# Patient Record
Sex: FEMALE | Race: WHITE | NOT HISPANIC OR LATINO | Employment: OTHER | URBAN - METROPOLITAN AREA
[De-identification: names, ages, dates, MRNs, and addresses within clinical notes are randomized per-mention and may not be internally consistent; named-entity substitution may affect disease eponyms.]

---

## 2017-08-10 ENCOUNTER — APPOINTMENT (OUTPATIENT)
Dept: WOMENS IMAGING | Facility: HOSPITAL | Age: 67
End: 2017-08-10

## 2017-08-10 PROCEDURE — G0202 SCR MAMMO BI INCL CAD: HCPCS | Performed by: RADIOLOGY

## 2018-10-16 ENCOUNTER — APPOINTMENT (OUTPATIENT)
Dept: WOMENS IMAGING | Facility: HOSPITAL | Age: 68
End: 2018-10-16

## 2018-10-16 PROCEDURE — 77067 SCR MAMMO BI INCL CAD: CPT | Performed by: RADIOLOGY

## 2019-11-19 ENCOUNTER — APPOINTMENT (OUTPATIENT)
Dept: WOMENS IMAGING | Facility: HOSPITAL | Age: 69
End: 2019-11-19

## 2019-11-19 PROCEDURE — 77067 SCR MAMMO BI INCL CAD: CPT | Performed by: RADIOLOGY

## 2021-01-29 ENCOUNTER — APPOINTMENT (OUTPATIENT)
Dept: WOMENS IMAGING | Facility: HOSPITAL | Age: 71
End: 2021-01-29

## 2021-01-29 PROCEDURE — 77067 SCR MAMMO BI INCL CAD: CPT | Performed by: RADIOLOGY

## 2022-02-17 ENCOUNTER — APPOINTMENT (OUTPATIENT)
Dept: WOMENS IMAGING | Facility: HOSPITAL | Age: 72
End: 2022-02-17

## 2022-02-17 PROCEDURE — 77067 SCR MAMMO BI INCL CAD: CPT | Performed by: RADIOLOGY

## 2023-01-30 ENCOUNTER — TRANSCRIBE ORDERS (OUTPATIENT)
Dept: CT IMAGING | Facility: CLINIC | Age: 73
End: 2023-01-30
Payer: MEDICARE

## 2023-01-30 DIAGNOSIS — Z12.31 ENCOUNTER FOR SCREENING MAMMOGRAM FOR MALIGNANT NEOPLASM OF BREAST: Primary | ICD-10-CM

## 2024-02-09 ENCOUNTER — TRANSCRIBE ORDERS (OUTPATIENT)
Dept: CT IMAGING | Facility: CLINIC | Age: 74
End: 2024-02-09
Payer: MEDICARE

## 2024-02-09 DIAGNOSIS — Z12.31 ENCOUNTER FOR SCREENING MAMMOGRAM FOR MALIGNANT NEOPLASM OF BREAST: Primary | ICD-10-CM

## 2024-06-25 ENCOUNTER — CONSULT (OUTPATIENT)
Dept: ONCOLOGY | Facility: CLINIC | Age: 74
End: 2024-06-25
Payer: MEDICARE

## 2024-06-25 VITALS
WEIGHT: 216 LBS | SYSTOLIC BLOOD PRESSURE: 123 MMHG | OXYGEN SATURATION: 97 % | HEIGHT: 61 IN | RESPIRATION RATE: 18 BRPM | TEMPERATURE: 97.8 F | BODY MASS INDEX: 40.78 KG/M2 | HEART RATE: 79 BPM | DIASTOLIC BLOOD PRESSURE: 74 MMHG

## 2024-06-25 DIAGNOSIS — I82.811 SUPERFICIAL THROMBOSIS OF RIGHT LOWER EXTREMITY: Primary | ICD-10-CM

## 2024-06-25 PROCEDURE — 1160F RVW MEDS BY RX/DR IN RCRD: CPT | Performed by: INTERNAL MEDICINE

## 2024-06-25 PROCEDURE — 1159F MED LIST DOCD IN RCRD: CPT | Performed by: INTERNAL MEDICINE

## 2024-06-25 PROCEDURE — 1126F AMNT PAIN NOTED NONE PRSNT: CPT | Performed by: INTERNAL MEDICINE

## 2024-06-25 PROCEDURE — 99205 OFFICE O/P NEW HI 60 MIN: CPT | Performed by: INTERNAL MEDICINE

## 2024-06-25 RX ORDER — ASPIRIN 81 MG/1
1 TABLET, CHEWABLE ORAL DAILY
COMMUNITY

## 2024-06-25 RX ORDER — ATORVASTATIN CALCIUM 80 MG/1
1 TABLET, FILM COATED ORAL DAILY
COMMUNITY
Start: 2024-04-10

## 2024-06-25 RX ORDER — LISINOPRIL 10 MG/1
1 TABLET ORAL DAILY
COMMUNITY
Start: 1998-04-01

## 2024-06-25 NOTE — PROGRESS NOTES
CHIEF COMPLAINT: Chronic right calf thrombophlebitis status post surgery doing well    REASON FOR REFERRAL: Questionable hypercoagulable disorder      RECORDS OBTAINED  Records of the patients history including those obtained from patient and primary care and vascular surgeon were reviewed and summarized in detail.    Oncology/Hematology History Overview Note   1.  Bilateral varicose veins with thrombophlebitis and superficial vein thrombosis  2.  Vertebral artery stenosis  3.  Basal cell carcinoma  4.  Reflux  5.  Hypertension    Hematology history timeline:  -1/11/2024 primary care note indicates possible blood clot noticed yesterday with 3 palpable area is right medial leg firm and tender to the touch.  Pain with walking.  Feels like a deep bruise.  Takes daily baby aspirin.  No provocation.  COVID booster November 2023.  -4/26/2024 note from Dr. Filemon Sheppard vascular surgeon saw patient for follow-up with venous reflux imaging.  Last seen 3 months prior.  Compliant with compression elevation and exercise efforts daily with minimal relief of her symptoms.  Continues to have lower extremity edema with bulging varicosities that are tender and painful.  Has used heat for thrombophlebitis but no improvement.  Patient reports initially having right lower extremity clots 20 years ago where 2 or more varicosities thrombosed spontaneously.  Denies any deep vein thrombosis.  She said this has happened 2 more times in the past 20 years and always appears to be in the superficial system.  She has a known family history of what she thinks is factor V Leiden mutation.  Her son had a PE and heart attack at age 46 who was not worked up for this disorder.  He referred to hematology for opinion on whether blood thinners are needed or not.  On exam this day she had large bulging varicosities right medial thigh and calf progressively worsened because of pain and tenderness to the area.  Additionally has lower extremity edema.   Compliant with 30 mmHg compression, elevation and exercise over the prior 3 months with only minor improvement.  Venous reflux studies in his office this day showed grade 4 reflux of the right common femoral vein, saphenofemoral junction, greater saphenous vein with grade 2 reflux of the common femoral vein and SVT is noted in the proximal calf varicosities.  On the left she has has grade 2 reflux of the saphenofemoral junction.  He believes the patient would benefit from endovascular laser ablation of the right greater saphenous vein with microphlebectomy of varicosities.  Risks and alternatives explained.    - 6/25/2024 [facility] consult: I reviewed the patient's history and confirm the accuracy as above.  1/11/2024 Doppler venous imaging right lower extremity [facility] showed partially thrombosed varicose vein right calf with no DVT.  Specific anatomy not mentioned on report.  Repeat ultrasonography 4/26/2024 at ' office reported superficial venous thrombus in the proximal calf varicosities.  He plans endovascular laser ablation right greater saphenous vein with microphlebectomy of varicosities.  CBC unremarkable in January with normal baseline PT and PTT and CMP.  Bilateral screening mammogram 3/4/2024 BI-RADS 1.  Had benign colonoscopy within the last 3 years.  Thinks there may be a family history in second-degree relative with some hypercoagulable condition but they are not sure exactly which and had a son who had a PE with an MI in his 40s.  She had endovascular laser ablation with right greater saphenous vein microphlebectomy of the varicosities about 3 weeks ago and feels great and had no complications thereafter.  States that about a week out they repeated her ultrasound and showed no new or propagating clot postop on baby aspirin which she was on throughout this whole process.  The process itself started 20 years ago with large varicose veins and over time the right calf veins  have become tender and inflamed and ultimately thrombosed and I think that it is the order of events and not reverse order which is important as I do not think clot is the nidus of the problem but the result of the problem.  Vessel inflammation and stagnant see of flow together conspire to a local environment prone towards clotting and it would be highly unusual for a hypercoagulable disorder be that from inherited or acquired hypercoagulability or from malignancy but lurking without significant deep vein thrombosis or proximal progression over these last 20 years is exceedingly unlikely and now that she is doing well postop 3 weeks out from the above surgeries I would not likely start her on an anticoagulant at therapeutic or prophylactic low doses in the absence of a deep vein thrombotic progression or complications.  She has not had Trousseau's physiology.  All of the clot and all of the tenderness was in the chain of her right calf varix.  For completeness sake I will check these hypercoagulable labs for her informational purposes but I am unlikely to alter her regimen from aspirin.  For her situation, aspirin may actually have some advantage due to its anti-inflammatory component and the vessel wall inflammation that occurs with varices has is much to do with the procoagulant environment as does the stagnant flow from venous valvular reflux.     Superficial thrombosis of right lower extremity       HISTORY OF PRESENT ILLNESS:  The patient is a 73 y.o.  female, referred for question of hypercoagulable disorder as outlined in my notes above and below.  Doing well post vascular surgery 3 weeks ago    REVIEW OF SYSTEMS:  No complaints today    History reviewed. No pertinent past medical history.  Past Surgical History:   Procedure Laterality Date    VARICOSE VEIN SURGERY         Current Outpatient Medications on File Prior to Visit   Medication Sig Dispense Refill    atorvastatin (LIPITOR) 80 MG tablet Take 1  "tablet by mouth Daily.      lisinopril (PRINIVIL,ZESTRIL) 10 MG tablet Take 1 tablet by mouth Daily.      metFORMIN (GLUCOPHAGE) 500 MG tablet Every 12 (Twelve) Hours.      aspirin 81 MG chewable tablet Chew 1 tablet Daily.      Cholecalciferol 50 MCG (2000 UT) tablet Take 1 tablet by mouth Daily.      metoprolol tartrate (LOPRESSOR) 25 MG tablet Every 12 (Twelve) Hours.       No current facility-administered medications on file prior to visit.       No Known Allergies    Social History     Socioeconomic History    Marital status:    Tobacco Use    Smoking status: Never    Smokeless tobacco: Never   Vaping Use    Vaping status: Never Used   Substance and Sexual Activity    Alcohol use: Never    Drug use: Never       Family History   Problem Relation Age of Onset    Cancer Father     Diabetes Brother        PHYSICAL EXAM:  Right calf varicosities not prominent postoperatively and good healing of that environment with good arterial flow and venous egress.  No other cords.  Some varicosities in both legs minor at this point and nontender and noninflamed.  No venous stasis insufficiency changes.    /74   Pulse 79   Temp 97.8 °F (36.6 °C)   Resp 18   Ht 154.9 cm (61\")   Wt 98 kg (216 lb)   SpO2 97%   BMI 40.81 kg/m²     ECOG score: 1     No results found for: \"HGB\", \"HCT\", \"MCV\", \"PLT\", \"WBC\", \"NEUTROABS\", \"LYMPHSABS\", \"MONOSABS\", \"EOSABS\", \"BASOSABS\"  No results found for: \"GLUCOSE\", \"BUN\", \"CREATININE\", \"NA\", \"K\", \"CL\", \"CO2\", \"CALCIUM\", \"PROTEINTOT\", \"ALBUMIN\", \"BILITOT\", \"ALKPHOS\", \"AST\", \"ALT\"      Assessment & Plan   1.  Bilateral varicose veins with thrombophlebitis and superficial vein thrombosis of proximal right calf varicosity.  2.  Vertebral artery stenosis  3.  Basal cell carcinoma  4.  Reflux  5.  Hypertension    Hematology history timeline:  -1/11/2024 primary care note indicates possible blood clot noticed yesterday with 3 palpable area is right medial leg firm and tender to the touch. "  Pain with walking.  Feels like a deep bruise.  Takes daily baby aspirin.  No provocation.  COVID booster November 2023.  -4/26/2024 note from Dr. Filemon Sheppard vascular surgeon saw patient for follow-up with venous reflux imaging.  Last seen 3 months prior.  Compliant with compression elevation and exercise efforts daily with minimal relief of her symptoms.  Continues to have lower extremity edema with bulging varicosities that are tender and painful.  Has used heat for thrombophlebitis but no improvement.  Patient reports initially having right lower extremity clots 20 years ago where 2 or more varicosities thrombosed spontaneously.  Denies any deep vein thrombosis.  She said this has happened 2 more times in the past 20 years and always appears to be in the superficial system.  She has a known family history of what she thinks is factor V Leiden mutation.  Her son had a PE and heart attack at age 46 who was not worked up for this disorder.  He referred to hematology for opinion on whether blood thinners are needed or not.  On exam this day she had large bulging varicosities right medial thigh and calf progressively worsened because of pain and tenderness to the area.  Additionally has lower extremity edema.  Compliant with 30 mmHg compression, elevation and exercise over the prior 3 months with only minor improvement.  Venous reflux studies in his office this day showed grade 4 reflux of the right common femoral vein, saphenofemoral junction, greater saphenous vein with grade 2 reflux of the common femoral vein and SVT is noted in the proximal calf varicosities.  On the left she has has grade 2 reflux of the saphenofemoral junction.  He believes the patient would benefit from endovascular laser ablation of the right greater saphenous vein with microphlebectomy of varicosities.  Risks and alternatives explained.    - 6/25/2024 [facility] hematology consult: I reviewed the patient's history and confirm the accuracy as  above.  1/11/2024 Doppler venous imaging right lower extremity [facility] showed partially thrombosed varicose vein right calf with no DVT.  Specific anatomy not mentioned on report.  Repeat ultrasonography 4/26/2024 at ' office reported superficial venous thrombus in the proximal calf varicosities.  He plans endovascular laser ablation right greater saphenous vein with microphlebectomy of varicosities.  CBC unremarkable in January with normal baseline PT and PTT and CMP.  Bilateral screening mammogram 3/4/2024 BI-RADS 1.  Had benign colonoscopy within the last 3 years.  Thinks there may be a family history in second-degree relative with some hypercoagulable condition but they are not sure exactly which and had a son who had a PE with an MI in his 40s.  She had endovascular laser ablation with right greater saphenous vein microphlebectomy of the varicosities about 3 weeks ago and feels great and had no complications thereafter.  States that about a week out they repeated her ultrasound and showed no new or propagating clot postop on baby aspirin which she was on throughout this whole process.  The process itself started 20 years ago with large varicose veins and over time the right calf veins have become tender and inflamed and ultimately thrombosed and I think that it is the order of events and not reverse order which is important as I do not think clot is the nidus of the problem but the result of the problem.  Vessel inflammation and stagnant see of flow together conspire to a local environment prone towards clotting and it would be highly unusual for a hypercoagulable disorder be that from inherited or acquired hypercoagulability or from malignancy but lurking without significant deep vein thrombosis or proximal progression over these last 20 years is exceedingly unlikely and now that she is doing well postop 3 weeks out from the above surgeries I would not likely start her on an anticoagulant at  therapeutic or prophylactic low doses in the absence of a deep vein thrombotic progression or complications.  She has not had Trousseau's physiology.  All of the clot and all of the tenderness was in the chain of her right calf varix.  For completeness sake I will check these hypercoagulable labs for her informational purposes but I am unlikely to alter her regimen from aspirin.  For her situation, aspirin may actually have some advantage due to its anti-inflammatory component and the vessel wall inflammation that occurs with varices has is much to do with the procoagulant environment as does the stagnant flow from venous valvular reflux.    Total time of care today inclusive of time spent today prior to patient's arrival reviewing past records and during visit interviewing her as to signs or symptoms of this disease and management thereof and after visit instituting this plan and communicating with her vascular surgeon took 1 hour patient care time throughout the day today.      Bud Iglesias MD    6/25/2024

## 2024-06-25 NOTE — LETTER
June 25, 2024       No Recipients    Patient: Gypsy Tierney   YOB: 1950   Date of Visit: 6/25/2024     Dear JESICA Gaviria:       Thank you for referring Gypsy Tierney to me for evaluation. Below are the relevant portions of my assessment and plan of care.    If you have questions, please do not hesitate to call me. I look forward to following Gypsy along with you.         Sincerely,        Bud Iglesias MD        CC:   No Recipients    Bud Iglesias MD  06/25/24 1332  Sign when Signing Visit  CHIEF COMPLAINT: Chronic right calf thrombophlebitis status post surgery doing well    REASON FOR REFERRAL: Questionable hypercoagulable disorder      RECORDS OBTAINED  Records of the patients history including those obtained from patient and primary care and vascular surgeon were reviewed and summarized in detail.    Oncology/Hematology History Overview Note   1.  Bilateral varicose veins with thrombophlebitis and superficial vein thrombosis  2.  Vertebral artery stenosis  3.  Basal cell carcinoma  4.  Reflux  5.  Hypertension    Hematology history timeline:  -1/11/2024 primary care note indicates possible blood clot noticed yesterday with 3 palpable area is right medial leg firm and tender to the touch.  Pain with walking.  Feels like a deep bruise.  Takes daily baby aspirin.  No provocation.  COVID booster November 2023.  -4/26/2024 note from Dr. Filemon Sheppard vascular surgeon saw patient for follow-up with venous reflux imaging.  Last seen 3 months prior.  Compliant with compression elevation and exercise efforts daily with minimal relief of her symptoms.  Continues to have lower extremity edema with bulging varicosities that are tender and painful.  Has used heat for thrombophlebitis but no improvement.  Patient reports initially having right lower extremity clots 20 years ago where 2 or more varicosities thrombosed spontaneously.  Denies any deep vein thrombosis.  She said this has happened 2 more times  in the past 20 years and always appears to be in the superficial system.  She has a known family history of what she thinks is factor V Leiden mutation.  Her son had a PE and heart attack at age 46 who was not worked up for this disorder.  He referred to hematology for opinion on whether blood thinners are needed or not.  On exam this day she had large bulging varicosities right medial thigh and calf progressively worsened because of pain and tenderness to the area.  Additionally has lower extremity edema.  Compliant with 30 mmHg compression, elevation and exercise over the prior 3 months with only minor improvement.  Venous reflux studies in his office this day showed grade 4 reflux of the right common femoral vein, saphenofemoral junction, greater saphenous vein with grade 2 reflux of the common femoral vein and SVT is noted in the proximal calf varicosities.  On the left she has has grade 2 reflux of the saphenofemoral junction.  He believes the patient would benefit from endovascular laser ablation of the right greater saphenous vein with microphlebectomy of varicosities.  Risks and alternatives explained.    - 6/25/2024 [facility] consult: I reviewed the patient's history and confirm the accuracy as above.  1/11/2024 Doppler venous imaging right lower extremity [facility] showed partially thrombosed varicose vein right calf with no DVT.  Specific anatomy not mentioned on report.  Repeat ultrasonography 4/26/2024 at ' office reported superficial venous thrombus in the proximal calf varicosities.  He plans endovascular laser ablation right greater saphenous vein with microphlebectomy of varicosities.  CBC unremarkable in January with normal baseline PT and PTT and CMP.  Bilateral screening mammogram 3/4/2024 BI-RADS 1.  Had benign colonoscopy within the last 3 years.  Thinks there may be a family history in second-degree relative with some hypercoagulable condition but they are not sure  exactly which and had a son who had a PE with an MI in his 40s.  She had endovascular laser ablation with right greater saphenous vein microphlebectomy of the varicosities about 3 weeks ago and feels great and had no complications thereafter.  States that about a week out they repeated her ultrasound and showed no new or propagating clot postop on baby aspirin which she was on throughout this whole process.  The process itself started 20 years ago with large varicose veins and over time the right calf veins have become tender and inflamed and ultimately thrombosed and I think that it is the order of events and not reverse order which is important as I do not think clot is the nidus of the problem but the result of the problem.  Vessel inflammation and stagnant see of flow together conspire to a local environment prone towards clotting and it would be highly unusual for a hypercoagulable disorder be that from inherited or acquired hypercoagulability or from malignancy but lurking without significant deep vein thrombosis or proximal progression over these last 20 years is exceedingly unlikely and now that she is doing well postop 3 weeks out from the above surgeries I would not likely start her on an anticoagulant at therapeutic or prophylactic low doses in the absence of a deep vein thrombotic progression or complications.  She has not had Trousseau's physiology.  All of the clot and all of the tenderness was in the chain of her right calf varix.  For completeness sake I will check these hypercoagulable labs for her informational purposes but I am unlikely to alter her regimen from aspirin.  For her situation, aspirin may actually have some advantage due to its anti-inflammatory component and the vessel wall inflammation that occurs with varices has is much to do with the procoagulant environment as does the stagnant flow from venous valvular reflux.     Superficial thrombosis of right lower extremity       HISTORY  "OF PRESENT ILLNESS:  The patient is a 73 y.o.  female, referred for question of hypercoagulable disorder as outlined in my notes above and below.  Doing well post vascular surgery 3 weeks ago    REVIEW OF SYSTEMS:  No complaints today    History reviewed. No pertinent past medical history.  Past Surgical History:   Procedure Laterality Date   • VARICOSE VEIN SURGERY         Current Outpatient Medications on File Prior to Visit   Medication Sig Dispense Refill   • atorvastatin (LIPITOR) 80 MG tablet Take 1 tablet by mouth Daily.     • lisinopril (PRINIVIL,ZESTRIL) 10 MG tablet Take 1 tablet by mouth Daily.     • metFORMIN (GLUCOPHAGE) 500 MG tablet Every 12 (Twelve) Hours.     • aspirin 81 MG chewable tablet Chew 1 tablet Daily.     • Cholecalciferol 50 MCG (2000 UT) tablet Take 1 tablet by mouth Daily.     • metoprolol tartrate (LOPRESSOR) 25 MG tablet Every 12 (Twelve) Hours.       No current facility-administered medications on file prior to visit.       No Known Allergies    Social History     Socioeconomic History   • Marital status:    Tobacco Use   • Smoking status: Never   • Smokeless tobacco: Never   Vaping Use   • Vaping status: Never Used   Substance and Sexual Activity   • Alcohol use: Never   • Drug use: Never       Family History   Problem Relation Age of Onset   • Cancer Father    • Diabetes Brother        PHYSICAL EXAM:  Right calf varicosities not prominent postoperatively and good healing of that environment with good arterial flow and venous egress.  No other cords.  Some varicosities in both legs minor at this point and nontender and noninflamed.  No venous stasis insufficiency changes.    /74   Pulse 79   Temp 97.8 °F (36.6 °C)   Resp 18   Ht 154.9 cm (61\")   Wt 98 kg (216 lb)   SpO2 97%   BMI 40.81 kg/m²     ECOG score: 1     No results found for: \"HGB\", \"HCT\", \"MCV\", \"PLT\", \"WBC\", \"NEUTROABS\", \"LYMPHSABS\", \"MONOSABS\", \"EOSABS\", \"BASOSABS\"  No results found for: \"GLUCOSE\", " "\"BUN\", \"CREATININE\", \"NA\", \"K\", \"CL\", \"CO2\", \"CALCIUM\", \"PROTEINTOT\", \"ALBUMIN\", \"BILITOT\", \"ALKPHOS\", \"AST\", \"ALT\"      Assessment & Plan  1.  Bilateral varicose veins with thrombophlebitis and superficial vein thrombosis of proximal right calf varicosity.  2.  Vertebral artery stenosis  3.  Basal cell carcinoma  4.  Reflux  5.  Hypertension    Hematology history timeline:  -1/11/2024 primary care note indicates possible blood clot noticed yesterday with 3 palpable area is right medial leg firm and tender to the touch.  Pain with walking.  Feels like a deep bruise.  Takes daily baby aspirin.  No provocation.  COVID booster November 2023.  -4/26/2024 note from Dr. Filemon Sheppard vascular surgeon saw patient for follow-up with venous reflux imaging.  Last seen 3 months prior.  Compliant with compression elevation and exercise efforts daily with minimal relief of her symptoms.  Continues to have lower extremity edema with bulging varicosities that are tender and painful.  Has used heat for thrombophlebitis but no improvement.  Patient reports initially having right lower extremity clots 20 years ago where 2 or more varicosities thrombosed spontaneously.  Denies any deep vein thrombosis.  She said this has happened 2 more times in the past 20 years and always appears to be in the superficial system.  She has a known family history of what she thinks is factor V Leiden mutation.  Her son had a PE and heart attack at age 46 who was not worked up for this disorder.  He referred to hematology for opinion on whether blood thinners are needed or not.  On exam this day she had large bulging varicosities right medial thigh and calf progressively worsened because of pain and tenderness to the area.  Additionally has lower extremity edema.  Compliant with 30 mmHg compression, elevation and exercise over the prior 3 months with only minor improvement.  Venous reflux studies in his office this day showed grade 4 reflux of the right " common femoral vein, saphenofemoral junction, greater saphenous vein with grade 2 reflux of the common femoral vein and SVT is noted in the proximal calf varicosities.  On the left she has has grade 2 reflux of the saphenofemoral junction.  He believes the patient would benefit from endovascular laser ablation of the right greater saphenous vein with microphlebectomy of varicosities.  Risks and alternatives explained.    - 6/25/2024 [facility] consult: I reviewed the patient's history and confirm the accuracy as above.  1/11/2024 Doppler venous imaging right lower extremity [facility] showed partially thrombosed varicose vein right calf with no DVT.  Specific anatomy not mentioned on report.  Repeat ultrasonography 4/26/2024 at ' office reported superficial venous thrombus in the proximal calf varicosities.  He plans endovascular laser ablation right greater saphenous vein with microphlebectomy of varicosities.  CBC unremarkable in January with normal baseline PT and PTT and CMP.  Bilateral screening mammogram 3/4/2024 BI-RADS 1.  Had benign colonoscopy within the last 3 years.  Thinks there may be a family history in second-degree relative with some hypercoagulable condition but they are not sure exactly which and had a son who had a PE with an MI in his 40s.  She had endovascular laser ablation with right greater saphenous vein microphlebectomy of the varicosities about 3 weeks ago and feels great and had no complications thereafter.  States that about a week out they repeated her ultrasound and showed no new or propagating clot postop on baby aspirin which she was on throughout this whole process.  The process itself started 20 years ago with large varicose veins and over time the right calf veins have become tender and inflamed and ultimately thrombosed and I think that it is the order of events and not reverse order which is important as I do not think clot is the nidus of the problem  but the result of the problem.  Vessel inflammation and stagnant see of flow together conspire to a local environment prone towards clotting and it would be highly unusual for a hypercoagulable disorder be that from inherited or acquired hypercoagulability or from malignancy but lurking without significant deep vein thrombosis or proximal progression over these last 20 years is exceedingly unlikely and now that she is doing well postop 3 weeks out from the above surgeries I would not likely start her on an anticoagulant at therapeutic or prophylactic low doses in the absence of a deep vein thrombotic progression or complications.  She has not had Trousseau's physiology.  All of the clot and all of the tenderness was in the chain of her right calf varix.  For completeness sake I will check these hypercoagulable labs for her informational purposes but I am unlikely to alter her regimen from aspirin.  For her situation, aspirin may actually have some advantage due to its anti-inflammatory component and the vessel wall inflammation that occurs with varices has is much to do with the procoagulant environment as does the stagnant flow from venous valvular reflux.    Total time of care today inclusive of time spent today prior to patient's arrival reviewing past records and during visit interviewing her as to signs or symptoms of this disease and management thereof and after visit instituting this plan and communicating with her vascular surgeon took 1 hour patient care time throughout the day today.      Bud Iglesias MD    6/25/2024

## 2024-06-26 ENCOUNTER — LAB (OUTPATIENT)
Dept: LAB | Facility: HOSPITAL | Age: 74
End: 2024-06-26
Payer: MEDICARE

## 2024-06-26 DIAGNOSIS — I82.811 SUPERFICIAL THROMBOSIS OF RIGHT LOWER EXTREMITY: ICD-10-CM

## 2024-06-26 PROCEDURE — 85302 CLOT INHIBIT PROT C ANTIGEN: CPT

## 2024-06-26 PROCEDURE — 85300 ANTITHROMBIN III ACTIVITY: CPT

## 2024-06-26 PROCEDURE — 81240 F2 GENE: CPT

## 2024-06-26 PROCEDURE — 85613 RUSSELL VIPER VENOM DILUTED: CPT

## 2024-06-26 PROCEDURE — 85306 CLOT INHIBIT PROT S FREE: CPT

## 2024-06-26 PROCEDURE — 85303 CLOT INHIBIT PROT C ACTIVITY: CPT

## 2024-06-26 PROCEDURE — 85705 THROMBOPLASTIN INHIBITION: CPT

## 2024-06-26 PROCEDURE — 36415 COLL VENOUS BLD VENIPUNCTURE: CPT

## 2024-06-26 PROCEDURE — 86147 CARDIOLIPIN ANTIBODY EA IG: CPT

## 2024-06-26 PROCEDURE — 85670 THROMBIN TIME PLASMA: CPT

## 2024-06-26 PROCEDURE — 81241 F5 GENE: CPT

## 2024-06-26 PROCEDURE — 86146 BETA-2 GLYCOPROTEIN ANTIBODY: CPT

## 2024-06-26 PROCEDURE — 85732 THROMBOPLASTIN TIME PARTIAL: CPT

## 2024-06-26 PROCEDURE — 85305 CLOT INHIBIT PROT S TOTAL: CPT

## 2024-06-27 LAB
APTT SCREEN TO CONFIRM RATIO: 1.14 RATIO (ref 0–1.34)
AT III ACT/NOR PPP CHRO: 135 % (ref 75–135)
CARDIOLIPIN IGG SER IA-ACNC: <9 GPL U/ML (ref 0–14)
CARDIOLIPIN IGM SER IA-ACNC: <9 MPL U/ML (ref 0–12)
CONFIRM APTT/NORMAL: 33 SEC (ref 0–47.6)
LA 2 SCREEN W REFLEX-IMP: NORMAL
PROT C ACT/NOR PPP: 174 % (ref 73–180)
PROT S ACT/NOR PPP: 108 % (ref 63–140)
SCREEN APTT: 30.6 SEC (ref 0–43.5)
SCREEN DRVVT: 36.4 SEC (ref 0–47)
THROMBIN TIME: 19.7 SEC (ref 0–23)

## 2024-06-28 LAB
F5 GENE MUT ANL BLD/T: NORMAL
FACTOR II, DNA ANALYSIS: NORMAL

## 2024-06-29 LAB
B2 GLYCOPROT1 IGA SER-ACNC: <9 GPI IGA UNITS (ref 0–25)
B2 GLYCOPROT1 IGG SER-ACNC: <9 GPI IGG UNITS (ref 0–20)
B2 GLYCOPROT1 IGM SER-ACNC: 10 GPI IGM UNITS (ref 0–32)

## 2024-06-30 LAB
PROT C AG ACT/NOR PPP IA: 142 % (ref 60–150)
PROT S AG ACT/NOR PPP IA: 119 % (ref 60–150)
PROT S FREE AG ACT/NOR PPP IA: 115 % (ref 61–136)

## 2024-08-06 ENCOUNTER — OFFICE VISIT (OUTPATIENT)
Dept: ONCOLOGY | Facility: CLINIC | Age: 74
End: 2024-08-06
Payer: MEDICARE

## 2024-08-06 VITALS
TEMPERATURE: 98.4 F | WEIGHT: 218 LBS | BODY MASS INDEX: 41.16 KG/M2 | HEIGHT: 61 IN | HEART RATE: 72 BPM | OXYGEN SATURATION: 97 % | SYSTOLIC BLOOD PRESSURE: 118 MMHG | DIASTOLIC BLOOD PRESSURE: 78 MMHG | RESPIRATION RATE: 18 BRPM

## 2024-08-06 DIAGNOSIS — I82.811 SUPERFICIAL THROMBOSIS OF RIGHT LOWER EXTREMITY: Primary | ICD-10-CM

## 2024-08-06 PROCEDURE — 1126F AMNT PAIN NOTED NONE PRSNT: CPT | Performed by: INTERNAL MEDICINE

## 2024-08-06 PROCEDURE — 1160F RVW MEDS BY RX/DR IN RCRD: CPT | Performed by: INTERNAL MEDICINE

## 2024-08-06 PROCEDURE — 1159F MED LIST DOCD IN RCRD: CPT | Performed by: INTERNAL MEDICINE

## 2024-08-06 PROCEDURE — 99214 OFFICE O/P EST MOD 30 MIN: CPT | Performed by: INTERNAL MEDICINE

## 2024-08-06 NOTE — PROGRESS NOTES
CHIEF COMPLAINT: No new somatic complaints    Problem List:  Oncology/Hematology History Overview Note   1.  Bilateral varicose veins with thrombophlebitis and superficial vein thrombosis with negative hypercoagulable workup  2.  Vertebral artery stenosis  3.  Basal cell carcinoma  4.  Reflux  5.  Hypertension    Hematology history timeline:  -1/11/2024 primary care note indicates possible blood clot noticed yesterday with 3 palpable area is right medial leg firm and tender to the touch.  Pain with walking.  Feels like a deep bruise.  Takes daily baby aspirin.  No provocation.  COVID booster November 2023.  -4/26/2024 note from Dr. Filemon Sheppard vascular surgeon saw patient for follow-up with venous reflux imaging.  Last seen 3 months prior.  Compliant with compression elevation and exercise efforts daily with minimal relief of her symptoms.  Continues to have lower extremity edema with bulging varicosities that are tender and painful.  Has used heat for thrombophlebitis but no improvement.  Patient reports initially having right lower extremity clots 20 years ago where 2 or more varicosities thrombosed spontaneously.  Denies any deep vein thrombosis.  She said this has happened 2 more times in the past 20 years and always appears to be in the superficial system.  She has a known family history of what she thinks is factor V Leiden mutation.  Her son had a PE and heart attack at age 46 who was not worked up for this disorder.  He referred to hematology for opinion on whether blood thinners are needed or not.  On exam this day she had large bulging varicosities right medial thigh and calf progressively worsened because of pain and tenderness to the area.  Additionally has lower extremity edema.  Compliant with 30 mmHg compression, elevation and exercise over the prior 3 months with only minor improvement.  Venous reflux studies in his office this day showed grade 4 reflux of the right common femoral vein, saphenofemoral  junction, greater saphenous vein with grade 2 reflux of the common femoral vein and SVT is noted in the proximal calf varicosities.  On the left she has has grade 2 reflux of the saphenofemoral junction.  He believes the patient would benefit from endovascular laser ablation of the right greater saphenous vein with microphlebectomy of varicosities.  Risks and alternatives explained.    - 6/25/2024 [facility] consult: I reviewed the patient's history and confirm the accuracy as above.  1/11/2024 Doppler venous imaging right lower extremity [facility] showed partially thrombosed varicose vein right calf with no DVT.  Specific anatomy not mentioned on report.  Repeat ultrasonography 4/26/2024 at ' office reported superficial venous thrombus in the proximal calf varicosities.  He plans endovascular laser ablation right greater saphenous vein with microphlebectomy of varicosities.  CBC unremarkable in January with normal baseline PT and PTT and CMP.  Bilateral screening mammogram 3/4/2024 BI-RADS 1.  Had benign colonoscopy within the last 3 years.  Thinks there may be a family history in second-degree relative with some hypercoagulable condition but they are not sure exactly which and had a son who had a PE with an MI in his 40s.  She had endovascular laser ablation with right greater saphenous vein microphlebectomy of the varicosities about 3 weeks ago and feels great and had no complications thereafter.  States that about a week out they repeated her ultrasound and showed no new or propagating clot postop on baby aspirin which she was on throughout this whole process.  The process itself started 20 years ago with large varicose veins and over time the right calf veins have become tender and inflamed and ultimately thrombosed and I think that it is the order of events and not reverse order which is important as I do not think clot is the nidus of the problem but the result of the problem.  Vessel  inflammation and stagnancy of flow together conspire to make a local environment prone towards clotting and it would be highly unusual for a hypercoagulable disorder be that from inherited or acquired hypercoagulability or from malignancy but lurking without significant deep vein thrombosis or proximal progression over these last 20 years is exceedingly unlikely and now that she is doing well postop 3 weeks out from the above surgeries I would not likely start her on an anticoagulant at therapeutic or prophylactic low doses in the absence of a deep vein thrombotic progression or complications.  She has not had Trousseau's physiology.  All of the clot and all of the tenderness was in the chain of her right calf varix.  For completeness sake I will check these hypercoagulable labs for her informational purposes but I am unlikely to alter her regimen from aspirin.  For her situation, aspirin may actually have some advantage due to its anti-inflammatory component and the vessel wall inflammation that occurs with varices has as much to do with the procoagulant environment as does the stagnant flow from venous valvular reflux.    -6/26/2024: Factor V Leiden and prothrombin gene mutations negative.  Protein S functional 108%.Antigen 115% free and 119% total.  Protein C antigen 142% [id number]  Antithrombin %  Beta-2 glycoprotein 1 antibodies/Anticardiolipin antibodies/lupus anticoagulant all negative    -8/6/2024 [facility] follow-up: Hypercoagulable workup negative.  With chronic venous valvular varicosities and history of thrombosis in these bleed due to stagnant flow, long-term aspirin enteric-coated likely sufficient certainly would not anticoagulate at this junction unless she has progressive or embolism.  Should such happen, would be glad to see her back but otherwise can follow-up with primary      Superficial thrombosis of right lower extremity       HISTORY OF PRESENT ILLNESS:  The patient is a 74  "y.o. female, here for follow up on management of chronic venous varicosities with thrombosis    History reviewed. No pertinent past medical history.  Past Surgical History:   Procedure Laterality Date    VARICOSE VEIN SURGERY         No Known Allergies    Family History and Social History reviewed and changed as necessary    REVIEW OF SYSTEM:   No new somatic complaints    PHYSICAL EXAM:  Positive for varicosities but no Homans' sign and no significant bilateral or unilateral lower extremity swelling.  No respiratory distress jaundice icterus or pallor.    Vitals:    08/06/24 1338   BP: 118/78   Pulse: 72   Resp: 18   Temp: 98.4 °F (36.9 °C)   SpO2: 97%   Weight: 98.9 kg (218 lb)   Height: 154.9 cm (61\")     Vitals:    08/06/24 1338   PainSc: 0-No pain          ECOG score: 1           Vitals reviewed.    ECOG: (1) Restricted in Physically Strenuous Activity, Ambulatory & Able to Do Work of Light Nature    No results found for: \"HGB\", \"HCT\", \"MCV\", \"PLT\", \"WBC\", \"NEUTROABS\", \"LYMPHSABS\", \"MONOSABS\", \"EOSABS\", \"BASOSABS\"    No results found for: \"GLUCOSE\", \"BUN\", \"CREATININE\", \"NA\", \"K\", \"CL\", \"CO2\", \"CALCIUM\", \"PROTEINTOT\", \"ALBUMIN\", \"BILITOT\", \"ALKPHOS\", \"AST\", \"ALT\"          ASSESSMENT & PLAN:  1.  Bilateral varicose veins with thrombophlebitis and superficial vein thrombosis with negative hypercoagulable workup  2.  Vertebral artery stenosis  3.  Basal cell carcinoma  4.  Reflux  5.  Hypertension    Hematology history timeline:  -1/11/2024 primary care note indicates possible blood clot noticed yesterday with 3 palpable area is right medial leg firm and tender to the touch.  Pain with walking.  Feels like a deep bruise.  Takes daily baby aspirin.  No provocation.  COVID booster November 2023.  -4/26/2024 note from Dr. Filemon Sheppard vascular surgeon saw patient for follow-up with venous reflux imaging.  Last seen 3 months prior.  Compliant with compression elevation and exercise efforts daily with minimal relief of " her symptoms.  Continues to have lower extremity edema with bulging varicosities that are tender and painful.  Has used heat for thrombophlebitis but no improvement.  Patient reports initially having right lower extremity clots 20 years ago where 2 or more varicosities thrombosed spontaneously.  Denies any deep vein thrombosis.  She said this has happened 2 more times in the past 20 years and always appears to be in the superficial system.  She has a known family history of what she thinks is factor V Leiden mutation.  Her son had a PE and heart attack at age 46 who was not worked up for this disorder.  He referred to hematology for opinion on whether blood thinners are needed or not.  On exam this day she had large bulging varicosities right medial thigh and calf progressively worsened because of pain and tenderness to the area.  Additionally has lower extremity edema.  Compliant with 30 mmHg compression, elevation and exercise over the prior 3 months with only minor improvement.  Venous reflux studies in his office this day showed grade 4 reflux of the right common femoral vein, saphenofemoral junction, greater saphenous vein with grade 2 reflux of the common femoral vein and SVT is noted in the proximal calf varicosities.  On the left she has has grade 2 reflux of the saphenofemoral junction.  He believes the patient would benefit from endovascular laser ablation of the right greater saphenous vein with microphlebectomy of varicosities.  Risks and alternatives explained.    - 6/25/2024 [facility] consult: I reviewed the patient's history and confirm the accuracy as above.  1/11/2024 Doppler venous imaging right lower extremity [facility] showed partially thrombosed varicose vein right calf with no DVT.  Specific anatomy not mentioned on report.  Repeat ultrasonography 4/26/2024 at ' office reported superficial venous thrombus in the proximal calf varicosities.  He plans endovascular laser  ablation right greater saphenous vein with microphlebectomy of varicosities.  CBC unremarkable in January with normal baseline PT and PTT and CMP.  Bilateral screening mammogram 3/4/2024 BI-RADS 1.  Had benign colonoscopy within the last 3 years.  Thinks there may be a family history in second-degree relative with some hypercoagulable condition but they are not sure exactly which and had a son who had a PE with an MI in his 40s.  She had endovascular laser ablation with right greater saphenous vein microphlebectomy of the varicosities about 3 weeks ago and feels great and had no complications thereafter.  States that about a week out they repeated her ultrasound and showed no new or propagating clot postop on baby aspirin which she was on throughout this whole process.  The process itself started 20 years ago with large varicose veins and over time the right calf veins have become tender and inflamed and ultimately thrombosed and I think that it is the order of events and not reverse order which is important as I do not think clot is the nidus of the problem but the result of the problem.  Vessel inflammation and stagnancy of flow together conspire to make a local environment prone towards clotting and it would be highly unusual for a hypercoagulable disorder be that from inherited or acquired hypercoagulability or from malignancy but lurking without significant deep vein thrombosis or proximal progression over these last 20 years is exceedingly unlikely and now that she is doing well postop 3 weeks out from the above surgeries I would not likely start her on an anticoagulant at therapeutic or prophylactic low doses in the absence of a deep vein thrombotic progression or complications.  She has not had Trousseau's physiology.  All of the clot and all of the tenderness was in the chain of her right calf varix.  For completeness sake I will check these hypercoagulable labs for her informational purposes but I am  unlikely to alter her regimen from aspirin.  For her situation, aspirin may actually have some advantage due to its anti-inflammatory component and the vessel wall inflammation that occurs with varices has as much to do with the procoagulant environment as does the stagnant flow from venous valvular reflux.    -6/26/2024: Factor V Leiden and prothrombin gene mutations negative.  Protein S functional 108%.Antigen 115% free and 119% total.  Protein C antigen 142% [id number]  Antithrombin %  Beta-2 glycoprotein 1 antibodies/Anticardiolipin antibodies/lupus anticoagulant all negative    -8/6/2024 [facility] follow-up: Hypercoagulable workup negative.  With chronic venous valvular varicosities and history of thrombosis in these bleed due to stagnant flow, long-term aspirin enteric-coated likely sufficient certainly would not anticoagulate at this junction unless she has progressive or embolism.  Should such happen, would be glad to see her back but otherwise can follow-up with primary     Total time of care today inclusive of time spent today prior to patient's arrival reviewing interval records and data and during visit interviewing her as to signs or symptoms of her disease and management thereof and after visit instituting this plan took 35 minutes patient care time throughout the day today.  Bud Iglesias MD    08/06/2024

## 2024-08-06 NOTE — LETTER
August 6, 2024       No Recipients    Patient: Gypsy Tierney   YOB: 1950   Date of Visit: 8/6/2024     Dear JESICA Gaviria:       Thank you for referring Gypsy Tierney to me for evaluation. Below are the relevant portions of my assessment and plan of care.    If you have questions, please do not hesitate to call me. I look forward to following Gypsy along with you.         Sincerely,        Bud Iglesias MD        CC:   No Recipients    Bud Iglesias MD  08/06/24 1405  Sign when Signing Visit  CHIEF COMPLAINT: No new somatic complaints    Problem List:  Oncology/Hematology History Overview Note   1.  Bilateral varicose veins with thrombophlebitis and superficial vein thrombosis with negative hypercoagulable workup  2.  Vertebral artery stenosis  3.  Basal cell carcinoma  4.  Reflux  5.  Hypertension    Hematology history timeline:  -1/11/2024 primary care note indicates possible blood clot noticed yesterday with 3 palpable area is right medial leg firm and tender to the touch.  Pain with walking.  Feels like a deep bruise.  Takes daily baby aspirin.  No provocation.  COVID booster November 2023.  -4/26/2024 note from Dr. Filemon Sheppard vascular surgeon saw patient for follow-up with venous reflux imaging.  Last seen 3 months prior.  Compliant with compression elevation and exercise efforts daily with minimal relief of her symptoms.  Continues to have lower extremity edema with bulging varicosities that are tender and painful.  Has used heat for thrombophlebitis but no improvement.  Patient reports initially having right lower extremity clots 20 years ago where 2 or more varicosities thrombosed spontaneously.  Denies any deep vein thrombosis.  She said this has happened 2 more times in the past 20 years and always appears to be in the superficial system.  She has a known family history of what she thinks is factor V Leiden mutation.  Her son had a PE and heart attack at age 46 who was not worked up  for this disorder.  He referred to hematology for opinion on whether blood thinners are needed or not.  On exam this day she had large bulging varicosities right medial thigh and calf progressively worsened because of pain and tenderness to the area.  Additionally has lower extremity edema.  Compliant with 30 mmHg compression, elevation and exercise over the prior 3 months with only minor improvement.  Venous reflux studies in his office this day showed grade 4 reflux of the right common femoral vein, saphenofemoral junction, greater saphenous vein with grade 2 reflux of the common femoral vein and SVT is noted in the proximal calf varicosities.  On the left she has has grade 2 reflux of the saphenofemoral junction.  He believes the patient would benefit from endovascular laser ablation of the right greater saphenous vein with microphlebectomy of varicosities.  Risks and alternatives explained.    - 6/25/2024 [facility] consult: I reviewed the patient's history and confirm the accuracy as above.  1/11/2024 Doppler venous imaging right lower extremity [facility] showed partially thrombosed varicose vein right calf with no DVT.  Specific anatomy not mentioned on report.  Repeat ultrasonography 4/26/2024 at ' office reported superficial venous thrombus in the proximal calf varicosities.  He plans endovascular laser ablation right greater saphenous vein with microphlebectomy of varicosities.  CBC unremarkable in January with normal baseline PT and PTT and CMP.  Bilateral screening mammogram 3/4/2024 BI-RADS 1.  Had benign colonoscopy within the last 3 years.  Thinks there may be a family history in second-degree relative with some hypercoagulable condition but they are not sure exactly which and had a son who had a PE with an MI in his 40s.  She had endovascular laser ablation with right greater saphenous vein microphlebectomy of the varicosities about 3 weeks ago and feels great and had no  complications thereafter.  States that about a week out they repeated her ultrasound and showed no new or propagating clot postop on baby aspirin which she was on throughout this whole process.  The process itself started 20 years ago with large varicose veins and over time the right calf veins have become tender and inflamed and ultimately thrombosed and I think that it is the order of events and not reverse order which is important as I do not think clot is the nidus of the problem but the result of the problem.  Vessel inflammation and stagnancy of flow together conspire to make a local environment prone towards clotting and it would be highly unusual for a hypercoagulable disorder be that from inherited or acquired hypercoagulability or from malignancy but lurking without significant deep vein thrombosis or proximal progression over these last 20 years is exceedingly unlikely and now that she is doing well postop 3 weeks out from the above surgeries I would not likely start her on an anticoagulant at therapeutic or prophylactic low doses in the absence of a deep vein thrombotic progression or complications.  She has not had Trousseau's physiology.  All of the clot and all of the tenderness was in the chain of her right calf varix.  For completeness sake I will check these hypercoagulable labs for her informational purposes but I am unlikely to alter her regimen from aspirin.  For her situation, aspirin may actually have some advantage due to its anti-inflammatory component and the vessel wall inflammation that occurs with varices has as much to do with the procoagulant environment as does the stagnant flow from venous valvular reflux.    -6/26/2024: Factor V Leiden and prothrombin gene mutations negative.  Protein S functional 108%.Antigen 115% free and 119% total.  Protein C antigen 142% [id number]  Antithrombin %  Beta-2 glycoprotein 1 antibodies/Anticardiolipin antibodies/lupus anticoagulant all  "negative    -8/6/2024 [facility] hematology follow-up: Hypercoagulable workup negative.  With chronic venous valvular varicosities and history of thrombosis in these bleed due to stagnant flow, long-term aspirin enteric-coated likely sufficient certainly would not anticoagulate at this junction unless she has progressive or embolism.  Should such happen, would be glad to see her back but otherwise can follow-up with primary      Superficial thrombosis of right lower extremity       HISTORY OF PRESENT ILLNESS:  The patient is a 74 y.o. female, here for follow up on management of chronic venous varicosities with thrombosis    History reviewed. No pertinent past medical history.  Past Surgical History:   Procedure Laterality Date   • VARICOSE VEIN SURGERY         No Known Allergies    Family History and Social History reviewed and changed as necessary    REVIEW OF SYSTEM:   No new somatic complaints    PHYSICAL EXAM:  Positive for varicosities but no Homans' sign and no significant bilateral or unilateral lower extremity swelling.  No respiratory distress jaundice icterus or pallor.    Vitals:    08/06/24 1338   BP: 118/78   Pulse: 72   Resp: 18   Temp: 98.4 °F (36.9 °C)   SpO2: 97%   Weight: 98.9 kg (218 lb)   Height: 154.9 cm (61\")     Vitals:    08/06/24 1338   PainSc: 0-No pain          ECOG score: 1           Vitals reviewed.    ECOG: (1) Restricted in Physically Strenuous Activity, Ambulatory & Able to Do Work of Light Nature    No results found for: \"HGB\", \"HCT\", \"MCV\", \"PLT\", \"WBC\", \"NEUTROABS\", \"LYMPHSABS\", \"MONOSABS\", \"EOSABS\", \"BASOSABS\"    No results found for: \"GLUCOSE\", \"BUN\", \"CREATININE\", \"NA\", \"K\", \"CL\", \"CO2\", \"CALCIUM\", \"PROTEINTOT\", \"ALBUMIN\", \"BILITOT\", \"ALKPHOS\", \"AST\", \"ALT\"          ASSESSMENT & PLAN:  1.  Bilateral varicose veins with thrombophlebitis and superficial vein thrombosis with negative hypercoagulable workup  2.  Vertebral artery stenosis  3.  Basal cell carcinoma  4.  Reflux  5.  " Hypertension    Hematology history timeline:  -1/11/2024 primary care note indicates possible blood clot noticed yesterday with 3 palpable area is right medial leg firm and tender to the touch.  Pain with walking.  Feels like a deep bruise.  Takes daily baby aspirin.  No provocation.  COVID booster November 2023.  -4/26/2024 note from Dr. Filemon Sheppard vascular surgeon saw patient for follow-up with venous reflux imaging.  Last seen 3 months prior.  Compliant with compression elevation and exercise efforts daily with minimal relief of her symptoms.  Continues to have lower extremity edema with bulging varicosities that are tender and painful.  Has used heat for thrombophlebitis but no improvement.  Patient reports initially having right lower extremity clots 20 years ago where 2 or more varicosities thrombosed spontaneously.  Denies any deep vein thrombosis.  She said this has happened 2 more times in the past 20 years and always appears to be in the superficial system.  She has a known family history of what she thinks is factor V Leiden mutation.  Her son had a PE and heart attack at age 46 who was not worked up for this disorder.  He referred to hematology for opinion on whether blood thinners are needed or not.  On exam this day she had large bulging varicosities right medial thigh and calf progressively worsened because of pain and tenderness to the area.  Additionally has lower extremity edema.  Compliant with 30 mmHg compression, elevation and exercise over the prior 3 months with only minor improvement.  Venous reflux studies in his office this day showed grade 4 reflux of the right common femoral vein, saphenofemoral junction, greater saphenous vein with grade 2 reflux of the common femoral vein and SVT is noted in the proximal calf varicosities.  On the left she has has grade 2 reflux of the saphenofemoral junction.  He believes the patient would benefit from endovascular laser ablation of the right greater  saphenous vein with microphlebectomy of varicosities.  Risks and alternatives explained.    - 6/25/2024 [facility]ology consult: I reviewed the patient's history and confirm the accuracy as above.  1/11/2024 Doppler venous imaging right lower extremity [facility] showed partially thrombosed varicose vein right calf with no DVT.  Specific anatomy not mentioned on report.  Repeat ultrasonography 4/26/2024 at ' office reported superficial venous thrombus in the proximal calf varicosities.  He plans endovascular laser ablation right greater saphenous vein with microphlebectomy of varicosities.  CBC unremarkable in January with normal baseline PT and PTT and CMP.  Bilateral screening mammogram 3/4/2024 BI-RADS 1.  Had benign colonoscopy within the last 3 years.  Thinks there may be a family history in second-degree relative with some hypercoagulable condition but they are not sure exactly which and had a son who had a PE with an MI in his 40s.  She had endovascular laser ablation with right greater saphenous vein microphlebectomy of the varicosities about 3 weeks ago and feels great and had no complications thereafter.  States that about a week out they repeated her ultrasound and showed no new or propagating clot postop on baby aspirin which she was on throughout this whole process.  The process itself started 20 years ago with large varicose veins and over time the right calf veins have become tender and inflamed and ultimately thrombosed and I think that it is the order of events and not reverse order which is important as I do not think clot is the nidus of the problem but the result of the problem.  Vessel inflammation and stagnancy of flow together conspire to make a local environment prone towards clotting and it would be highly unusual for a hypercoagulable disorder be that from inherited or acquired hypercoagulability or from malignancy but lurking without significant deep vein thrombosis or  proximal progression over these last 20 years is exceedingly unlikely and now that she is doing well postop 3 weeks out from the above surgeries I would not likely start her on an anticoagulant at therapeutic or prophylactic low doses in the absence of a deep vein thrombotic progression or complications.  She has not had Trousseau's physiology.  All of the clot and all of the tenderness was in the chain of her right calf varix.  For completeness sake I will check these hypercoagulable labs for her informational purposes but I am unlikely to alter her regimen from aspirin.  For her situation, aspirin may actually have some advantage due to its anti-inflammatory component and the vessel wall inflammation that occurs with varices has as much to do with the procoagulant environment as does the stagnant flow from venous valvular reflux.    -6/26/2024: Factor V Leiden and prothrombin gene mutations negative.  Protein S functional 108%.Antigen 115% free and 119% total.  Protein C antigen 142% [id number]  Antithrombin %  Beta-2 glycoprotein 1 antibodies/Anticardiolipin antibodies/lupus anticoagulant all negative    -8/6/2024 [facility] follow-up: Hypercoagulable workup negative.  With chronic venous valvular varicosities and history of thrombosis in these bleed due to stagnant flow, long-term aspirin enteric-coated likely sufficient certainly would not anticoagulate at this junction unless she has progressive or embolism.  Should such happen, would be glad to see her back but otherwise can follow-up with primary     Total time of care today inclusive of time spent today prior to patient's arrival reviewing interval records and data and during visit interviewing her as to signs or symptoms of her disease and management thereof and after visit instituting this plan took 35 minutes patient care time throughout the day today.  Bud Iglesias MD    08/06/2024

## 2025-02-26 ENCOUNTER — TRANSCRIBE ORDERS (OUTPATIENT)
Dept: CT IMAGING | Facility: CLINIC | Age: 75
End: 2025-02-26
Payer: MEDICARE

## 2025-02-26 DIAGNOSIS — Z12.31 ENCOUNTER FOR SCREENING MAMMOGRAM FOR MALIGNANT NEOPLASM OF BREAST: Primary | ICD-10-CM
